# Patient Record
Sex: FEMALE | Race: WHITE
[De-identification: names, ages, dates, MRNs, and addresses within clinical notes are randomized per-mention and may not be internally consistent; named-entity substitution may affect disease eponyms.]

---

## 2018-03-06 ENCOUNTER — APPOINTMENT (OUTPATIENT)
Dept: INTERNAL MEDICINE | Facility: CLINIC | Age: 53
End: 2018-03-06
Payer: COMMERCIAL

## 2018-03-06 VITALS
WEIGHT: 107 LBS | RESPIRATION RATE: 14 BRPM | BODY MASS INDEX: 18.27 KG/M2 | OXYGEN SATURATION: 98 % | HEART RATE: 76 BPM | TEMPERATURE: 97.8 F | HEIGHT: 64 IN | SYSTOLIC BLOOD PRESSURE: 120 MMHG | DIASTOLIC BLOOD PRESSURE: 80 MMHG

## 2018-03-06 DIAGNOSIS — Z82.49 FAMILY HISTORY OF ISCHEMIC HEART DISEASE AND OTHER DISEASES OF THE CIRCULATORY SYSTEM: ICD-10-CM

## 2018-03-06 PROCEDURE — 99396 PREV VISIT EST AGE 40-64: CPT | Mod: 25

## 2018-03-06 PROCEDURE — 36415 COLL VENOUS BLD VENIPUNCTURE: CPT

## 2018-03-06 PROCEDURE — 93000 ELECTROCARDIOGRAM COMPLETE: CPT

## 2018-03-07 LAB
ALBUMIN SERPL ELPH-MCNC: 4.6 G/DL
ALP BLD-CCNC: 95 U/L
ALT SERPL-CCNC: 23 U/L
ANION GAP SERPL CALC-SCNC: 15 MMOL/L
AST SERPL-CCNC: 26 U/L
BASOPHILS # BLD AUTO: 0.03 K/UL
BASOPHILS NFR BLD AUTO: 0.9 %
BILIRUB SERPL-MCNC: 0.4 MG/DL
BUN SERPL-MCNC: 14 MG/DL
CALCIUM SERPL-MCNC: 9.9 MG/DL
CHLORIDE SERPL-SCNC: 103 MMOL/L
CHOLEST SERPL-MCNC: 186 MG/DL
CHOLEST/HDLC SERPL: 2.3 RATIO
CO2 SERPL-SCNC: 25 MMOL/L
CREAT SERPL-MCNC: 0.77 MG/DL
EOSINOPHIL # BLD AUTO: 0.07 K/UL
EOSINOPHIL NFR BLD AUTO: 2.2 %
GLUCOSE SERPL-MCNC: 98 MG/DL
HBA1C MFR BLD HPLC: 5.6 %
HCT VFR BLD CALC: 44.9 %
HDLC SERPL-MCNC: 81 MG/DL
HGB BLD-MCNC: 14 G/DL
IMM GRANULOCYTES NFR BLD AUTO: 0 %
LDLC SERPL CALC-MCNC: 95 MG/DL
LYMPHOCYTES # BLD AUTO: 1.14 K/UL
LYMPHOCYTES NFR BLD AUTO: 35.6 %
MAN DIFF?: NORMAL
MCHC RBC-ENTMCNC: 31.2 GM/DL
MCHC RBC-ENTMCNC: 31.7 PG
MCV RBC AUTO: 101.8 FL
MONOCYTES # BLD AUTO: 0.2 K/UL
MONOCYTES NFR BLD AUTO: 6.3 %
NEUTROPHILS # BLD AUTO: 1.76 K/UL
NEUTROPHILS NFR BLD AUTO: 55 %
PLATELET # BLD AUTO: 209 K/UL
POTASSIUM SERPL-SCNC: 4.7 MMOL/L
PROT SERPL-MCNC: 7.2 G/DL
RBC # BLD: 4.41 M/UL
RBC # FLD: 14.2 %
SODIUM SERPL-SCNC: 143 MMOL/L
TRIGL SERPL-MCNC: 51 MG/DL
TSH SERPL-ACNC: 1.85 UIU/ML
WBC # FLD AUTO: 3.2 K/UL

## 2018-05-30 ENCOUNTER — APPOINTMENT (OUTPATIENT)
Dept: INTERNAL MEDICINE | Facility: CLINIC | Age: 53
End: 2018-05-30
Payer: COMMERCIAL

## 2018-05-30 ENCOUNTER — LABORATORY RESULT (OUTPATIENT)
Age: 53
End: 2018-05-30

## 2018-05-30 VITALS
SYSTOLIC BLOOD PRESSURE: 108 MMHG | TEMPERATURE: 97.3 F | RESPIRATION RATE: 14 BRPM | DIASTOLIC BLOOD PRESSURE: 80 MMHG | HEIGHT: 64 IN | WEIGHT: 111 LBS | HEART RATE: 59 BPM | BODY MASS INDEX: 18.95 KG/M2 | OXYGEN SATURATION: 7 %

## 2018-05-30 DIAGNOSIS — R94.31 ABNORMAL ELECTROCARDIOGRAM [ECG] [EKG]: ICD-10-CM

## 2018-05-30 PROCEDURE — 99213 OFFICE O/P EST LOW 20 MIN: CPT

## 2018-05-30 NOTE — PHYSICAL EXAM
[No Acute Distress] : no acute distress [Well Nourished] : well nourished [Well Developed] : well developed [Soft] : abdomen soft [Normal Bowel Sounds] : normal bowel sounds [de-identified] : mild td over bladder

## 2018-05-30 NOTE — ASSESSMENT
[FreeTextEntry1] : UTI- tx with bactrim and send for ua and urine cx\par  advise f/up with dr Gant re: abnormal EKG

## 2018-05-30 NOTE — HISTORY OF PRESENT ILLNESS
[FreeTextEntry1] : uti [de-identified] : \par Here with uti symptoms - frequency, dysuria.  \par \par Questions regarding her abnormal EKG- \par Last cardiology visit was 3 years ago with Dr Gant.  Has a hx of stress test 3 years ago.

## 2018-05-31 LAB
APPEARANCE: ABNORMAL
BILIRUBIN URINE: NEGATIVE
BLOOD URINE: ABNORMAL
COLOR: YELLOW
GLUCOSE QUALITATIVE U: NEGATIVE MG/DL
KETONES URINE: NEGATIVE
LEUKOCYTE ESTERASE URINE: ABNORMAL
NITRITE URINE: POSITIVE
PH URINE: 6
PROTEIN URINE: NEGATIVE MG/DL
SPECIFIC GRAVITY URINE: 1.01
UROBILINOGEN URINE: NEGATIVE MG/DL

## 2018-12-10 ENCOUNTER — APPOINTMENT (OUTPATIENT)
Dept: OTOLARYNGOLOGY | Facility: CLINIC | Age: 53
End: 2018-12-10
Payer: COMMERCIAL

## 2018-12-10 VITALS
DIASTOLIC BLOOD PRESSURE: 75 MMHG | OXYGEN SATURATION: 97 % | TEMPERATURE: 98.3 F | HEART RATE: 43 BPM | RESPIRATION RATE: 15 BRPM | SYSTOLIC BLOOD PRESSURE: 138 MMHG

## 2018-12-10 VITALS — WEIGHT: 112 LBS | BODY MASS INDEX: 19.22 KG/M2

## 2018-12-10 DIAGNOSIS — Z82.49 FAMILY HISTORY OF ISCHEMIC HEART DISEASE AND OTHER DISEASES OF THE CIRCULATORY SYSTEM: ICD-10-CM

## 2018-12-10 DIAGNOSIS — Z80.9 FAMILY HISTORY OF MALIGNANT NEOPLASM, UNSPECIFIED: ICD-10-CM

## 2018-12-10 DIAGNOSIS — Z82.2 FAMILY HISTORY OF DEAFNESS AND HEARING LOSS: ICD-10-CM

## 2018-12-10 PROCEDURE — 99243 OFF/OP CNSLTJ NEW/EST LOW 30: CPT | Mod: 25

## 2018-12-10 PROCEDURE — 31231 NASAL ENDOSCOPY DX: CPT

## 2018-12-10 PROCEDURE — 92557 COMPREHENSIVE HEARING TEST: CPT

## 2018-12-10 PROCEDURE — 92550 TYMPANOMETRY & REFLEX THRESH: CPT

## 2018-12-17 ENCOUNTER — APPOINTMENT (OUTPATIENT)
Dept: OTOLARYNGOLOGY | Facility: CLINIC | Age: 53
End: 2018-12-17
Payer: COMMERCIAL

## 2018-12-17 VITALS
SYSTOLIC BLOOD PRESSURE: 141 MMHG | DIASTOLIC BLOOD PRESSURE: 74 MMHG | TEMPERATURE: 97.9 F | RESPIRATION RATE: 15 BRPM | HEART RATE: 50 BPM | OXYGEN SATURATION: 96 %

## 2018-12-17 PROCEDURE — 99213 OFFICE O/P EST LOW 20 MIN: CPT

## 2018-12-27 ENCOUNTER — APPOINTMENT (OUTPATIENT)
Dept: OTOLARYNGOLOGY | Facility: CLINIC | Age: 53
End: 2018-12-27
Payer: COMMERCIAL

## 2018-12-27 VITALS
SYSTOLIC BLOOD PRESSURE: 143 MMHG | OXYGEN SATURATION: 100 % | RESPIRATION RATE: 16 BRPM | DIASTOLIC BLOOD PRESSURE: 73 MMHG | HEART RATE: 48 BPM | TEMPERATURE: 97.6 F

## 2018-12-27 PROCEDURE — 69420 INCISION OF EARDRUM: CPT | Mod: RT

## 2018-12-27 PROCEDURE — 92567 TYMPANOMETRY: CPT

## 2018-12-27 PROCEDURE — 99213 OFFICE O/P EST LOW 20 MIN: CPT | Mod: 25

## 2019-01-03 ENCOUNTER — APPOINTMENT (OUTPATIENT)
Dept: OTOLARYNGOLOGY | Facility: CLINIC | Age: 54
End: 2019-01-03
Payer: COMMERCIAL

## 2019-01-03 VITALS
HEART RATE: 54 BPM | HEIGHT: 64 IN | OXYGEN SATURATION: 99 % | RESPIRATION RATE: 16 BRPM | BODY MASS INDEX: 19.12 KG/M2 | TEMPERATURE: 98 F | WEIGHT: 112 LBS | DIASTOLIC BLOOD PRESSURE: 87 MMHG | SYSTOLIC BLOOD PRESSURE: 135 MMHG

## 2019-01-03 PROCEDURE — 99024 POSTOP FOLLOW-UP VISIT: CPT

## 2019-01-17 ENCOUNTER — APPOINTMENT (OUTPATIENT)
Dept: OTOLARYNGOLOGY | Facility: CLINIC | Age: 54
End: 2019-01-17

## 2019-05-03 ENCOUNTER — TRANSCRIPTION ENCOUNTER (OUTPATIENT)
Age: 54
End: 2019-05-03

## 2019-08-02 ENCOUNTER — APPOINTMENT (OUTPATIENT)
Dept: INTERNAL MEDICINE | Facility: CLINIC | Age: 54
End: 2019-08-02
Payer: COMMERCIAL

## 2019-08-02 VITALS
WEIGHT: 111 LBS | OXYGEN SATURATION: 97 % | HEART RATE: 57 BPM | SYSTOLIC BLOOD PRESSURE: 123 MMHG | RESPIRATION RATE: 16 BRPM | DIASTOLIC BLOOD PRESSURE: 78 MMHG | HEIGHT: 64 IN | BODY MASS INDEX: 18.95 KG/M2

## 2019-08-02 DIAGNOSIS — M79.644 PAIN IN RIGHT FINGER(S): ICD-10-CM

## 2019-08-02 DIAGNOSIS — Z87.440 PERSONAL HISTORY OF URINARY (TRACT) INFECTIONS: ICD-10-CM

## 2019-08-02 DIAGNOSIS — H91.91 UNSPECIFIED HEARING LOSS, RIGHT EAR: ICD-10-CM

## 2019-08-02 DIAGNOSIS — Z78.9 OTHER SPECIFIED HEALTH STATUS: ICD-10-CM

## 2019-08-02 DIAGNOSIS — H92.01 OTALGIA, RIGHT EAR: ICD-10-CM

## 2019-08-02 DIAGNOSIS — Z72.89 OTHER PROBLEMS RELATED TO LIFESTYLE: ICD-10-CM

## 2019-08-02 PROCEDURE — 36415 COLL VENOUS BLD VENIPUNCTURE: CPT

## 2019-08-02 PROCEDURE — 99396 PREV VISIT EST AGE 40-64: CPT

## 2019-08-02 PROCEDURE — 93000 ELECTROCARDIOGRAM COMPLETE: CPT

## 2019-08-02 RX ORDER — SULFAMETHOXAZOLE AND TRIMETHOPRIM 800; 160 MG/1; MG/1
800-160 TABLET ORAL TWICE DAILY
Qty: 6 | Refills: 2 | Status: DISCONTINUED | COMMUNITY
Start: 2018-05-30 | End: 2019-08-02

## 2019-08-02 RX ORDER — OFLOXACIN OTIC 3 MG/ML
0.3 SOLUTION AURICULAR (OTIC) TWICE DAILY
Qty: 1 | Refills: 1 | Status: DISCONTINUED | COMMUNITY
Start: 2018-12-27 | End: 2019-08-02

## 2019-08-02 RX ORDER — OMEPRAZOLE 20 MG/1
20 CAPSULE, DELAYED RELEASE ORAL DAILY
Qty: 7 | Refills: 0 | Status: DISCONTINUED | COMMUNITY
Start: 2018-12-13 | End: 2019-08-02

## 2019-08-02 RX ORDER — AMOXICILLIN AND CLAVULANATE POTASSIUM 875; 125 MG/1; MG/1
875-125 TABLET, COATED ORAL
Qty: 14 | Refills: 0 | Status: DISCONTINUED | COMMUNITY
Start: 2018-12-10 | End: 2019-08-02

## 2019-08-02 RX ORDER — METHYLPREDNISOLONE 4 MG/1
4 TABLET ORAL
Qty: 1 | Refills: 0 | Status: DISCONTINUED | COMMUNITY
Start: 2018-12-13 | End: 2019-08-02

## 2019-08-02 RX ORDER — AMOXICILLIN 875 MG/1
875 TABLET, FILM COATED ORAL
Qty: 14 | Refills: 0 | Status: DISCONTINUED | COMMUNITY
Start: 2018-11-24 | End: 2019-08-02

## 2019-08-02 NOTE — PHYSICAL EXAM
[No Acute Distress] : no acute distress [Well Nourished] : well nourished [Well Developed] : well developed [Well-Appearing] : well-appearing [Normal Voice/Communication] : normal voice/communication [Normal Sclera/Conjunctiva] : normal sclera/conjunctiva [PERRL] : pupils equal round and reactive to light [EOMI] : extraocular movements intact [Normal Outer Ear/Nose] : the outer ears and nose were normal in appearance [Normal Oropharynx] : the oropharynx was normal [No JVD] : no jugular venous distention [No Lymphadenopathy] : no lymphadenopathy [Supple] : supple [Thyroid Normal, No Nodules] : the thyroid was normal and there were no nodules present [No Respiratory Distress] : no respiratory distress  [No Accessory Muscle Use] : no accessory muscle use [Clear to Auscultation] : lungs were clear to auscultation bilaterally [Regular Rhythm] : with a regular rhythm [Normal Rate] : normal rate  [Normal S1, S2] : normal S1 and S2 [No Murmur] : no murmur heard [Pedal Pulses Present] : the pedal pulses are present [No Varicosities] : no varicosities [No Edema] : there was no peripheral edema [No Extremity Clubbing/Cyanosis] : no extremity clubbing/cyanosis [Soft] : abdomen soft [Non Tender] : non-tender [No Masses] : no abdominal mass palpated [Non-distended] : non-distended [Normal Bowel Sounds] : normal bowel sounds [Normal Posterior Cervical Nodes] : no posterior cervical lymphadenopathy [Normal Anterior Cervical Nodes] : no anterior cervical lymphadenopathy [No Joint Swelling] : no joint swelling [Grossly Normal Strength/Tone] : grossly normal strength/tone [No Rash] : no rash [Coordination Grossly Intact] : coordination grossly intact [No Focal Deficits] : no focal deficits [Normal Gait] : normal gait [Normal Affect] : the affect was normal [Deep Tendon Reflexes (DTR)] : deep tendon reflexes were 2+ and symmetric [Normal Insight/Judgement] : insight and judgment were intact

## 2019-08-02 NOTE — PAST MEDICAL HISTORY
[Postmenopausal] : postmenopausal [Menopause Age____] : age at menopause was [unfilled] [Total Preg ___] : G[unfilled] [AB Spont ___] : miscarriages: [unfilled]

## 2019-08-04 PROBLEM — Z78.9 CAFFEINE USE: Status: ACTIVE | Noted: 2018-12-10

## 2019-08-04 LAB
ALBUMIN SERPL ELPH-MCNC: 4.6 G/DL
ALP BLD-CCNC: 88 U/L
ALT SERPL-CCNC: 19 U/L
ANION GAP SERPL CALC-SCNC: 11 MMOL/L
APPEARANCE: CLEAR
AST SERPL-CCNC: 22 U/L
BASOPHILS # BLD AUTO: 0.04 K/UL
BASOPHILS NFR BLD AUTO: 1.2 %
BILIRUB SERPL-MCNC: 0.5 MG/DL
BILIRUBIN URINE: NEGATIVE
BLOOD URINE: NEGATIVE
BUN SERPL-MCNC: 14 MG/DL
CALCIUM SERPL-MCNC: 9.6 MG/DL
CHLORIDE SERPL-SCNC: 106 MMOL/L
CHOLEST SERPL-MCNC: 203 MG/DL
CHOLEST/HDLC SERPL: 2.5 RATIO
CO2 SERPL-SCNC: 26 MMOL/L
COLOR: NORMAL
CREAT SERPL-MCNC: 0.66 MG/DL
EOSINOPHIL # BLD AUTO: 0.04 K/UL
EOSINOPHIL NFR BLD AUTO: 1.2 %
ESTIMATED AVERAGE GLUCOSE: 114 MG/DL
GLUCOSE QUALITATIVE U: NEGATIVE
GLUCOSE SERPL-MCNC: 94 MG/DL
HBA1C MFR BLD HPLC: 5.6 %
HCT VFR BLD CALC: 42.8 %
HDLC SERPL-MCNC: 81 MG/DL
HGB BLD-MCNC: 13.6 G/DL
IMM GRANULOCYTES NFR BLD AUTO: 0.3 %
KETONES URINE: NEGATIVE
LDLC SERPL CALC-MCNC: 113 MG/DL
LEUKOCYTE ESTERASE URINE: NEGATIVE
LYMPHOCYTES # BLD AUTO: 1.21 K/UL
LYMPHOCYTES NFR BLD AUTO: 35.1 %
MAN DIFF?: NORMAL
MCHC RBC-ENTMCNC: 31.5 PG
MCHC RBC-ENTMCNC: 31.8 GM/DL
MCV RBC AUTO: 99.1 FL
MONOCYTES # BLD AUTO: 0.3 K/UL
MONOCYTES NFR BLD AUTO: 8.7 %
NEUTROPHILS # BLD AUTO: 1.85 K/UL
NEUTROPHILS NFR BLD AUTO: 53.5 %
NITRITE URINE: NEGATIVE
PH URINE: 5.5
PLATELET # BLD AUTO: 196 K/UL
POTASSIUM SERPL-SCNC: 4.4 MMOL/L
PROT SERPL-MCNC: 6.8 G/DL
PROTEIN URINE: NEGATIVE
RBC # BLD: 4.32 M/UL
RBC # FLD: 13.5 %
SODIUM SERPL-SCNC: 143 MMOL/L
SPECIFIC GRAVITY URINE: 1.01
TRIGL SERPL-MCNC: 47 MG/DL
TSH SERPL-ACNC: 1.97 UIU/ML
UROBILINOGEN URINE: NORMAL
WBC # FLD AUTO: 3.45 K/UL

## 2019-08-04 NOTE — PLAN
[FreeTextEntry1] : # Health Maintenance - Pt feeling well with no issues. [place], mammo, Pap smear all up to date. Following Gyn, and Cards for fhx aortic aneurysm.\par - Will check CBC, CMP, A1c, Lipid profile, TSH, urinalysis today.\par - C/w vitamin D3 and yuvafem.\par - RTC annually or sooner if needed.

## 2019-08-04 NOTE — HISTORY OF PRESENT ILLNESS
[FreeTextEntry1] : Comprehensive Physical Exam [de-identified] : 52 yo female here for comprehensive physical exam. Feeling well and has no complaints.\par \par Colonoscopy in 2015. Mammogram this past December. Pap smear 2018.\par \par Had ear infection end of Nov 2018, went to ENT. Resolved with course of abx. \par Cards evaluation this April for fhx of Aortic aneurysms. ECHO revealed known mitral valve prolapse, otherwise results WNL.

## 2019-08-04 NOTE — REVIEW OF SYSTEMS
[Fever] : no fever [Chills] : no chills [Recent Change In Weight] : ~T no recent weight change [Vision Problems] : no vision problems [Earache] : no earache [Hearing Loss] : no hearing loss [Sore Throat] : no sore throat [Chest Pain] : no chest pain [Palpitations] : no palpitations [Shortness Of Breath] : no shortness of breath [Cough] : no cough [Abdominal Pain] : no abdominal pain [Constipation] : no constipation [Diarrhea] : diarrhea [Dysuria] : no dysuria [Frequency] : no frequency [Vaginal Discharge] : no vaginal discharge [Joint Pain] : no joint pain [Back Pain] : no back pain [Mole Changes] : no mole changes [Skin Rash] : no skin rash [Headache] : no headache [Dizziness] : no dizziness [Anxiety] : no anxiety [Depression] : no depression [Easy Bleeding] : no easy bleeding [Easy Bruising] : no easy bruising

## 2021-05-03 ENCOUNTER — APPOINTMENT (OUTPATIENT)
Dept: INTERNAL MEDICINE | Facility: CLINIC | Age: 56
End: 2021-05-03
Payer: MEDICAID

## 2021-05-03 VITALS
TEMPERATURE: 98.2 F | BODY MASS INDEX: 18.95 KG/M2 | HEART RATE: 67 BPM | DIASTOLIC BLOOD PRESSURE: 82 MMHG | SYSTOLIC BLOOD PRESSURE: 120 MMHG | WEIGHT: 111 LBS | OXYGEN SATURATION: 97 % | RESPIRATION RATE: 16 BRPM | HEIGHT: 64 IN

## 2021-05-03 DIAGNOSIS — R00.2 PALPITATIONS: ICD-10-CM

## 2021-05-03 DIAGNOSIS — Z82.62 FAMILY HISTORY OF OSTEOPOROSIS: ICD-10-CM

## 2021-05-03 PROCEDURE — 99072 ADDL SUPL MATRL&STAF TM PHE: CPT

## 2021-05-03 PROCEDURE — 36415 COLL VENOUS BLD VENIPUNCTURE: CPT

## 2021-05-03 PROCEDURE — 99396 PREV VISIT EST AGE 40-64: CPT

## 2021-05-03 NOTE — HISTORY OF PRESENT ILLNESS
[de-identified] : 56yo female with hx of mvp here for comprehensive physical exam. \par colonoscopy in 2015 - will be returning this year\par recent holter with pvcs and 4beat run of nsvt.\par Needs MRI heart for her mvp but gets very anxious\par \par right knee pain - has been a issue intermittently for years  boyfriend is a pt- she does pronate- seeing podiatry \par gets occasional sciatica.  walks regularly.\par \par s/p covid vaccine x2.  \par \par Mammo this year 2021

## 2021-05-03 NOTE — PHYSICAL EXAM
[No Edema] : there was no peripheral edema [Normal] : affect was normal and insight and judgment were intact [de-identified] : point td right knee medial aspect

## 2021-05-03 NOTE — PLAN
[FreeTextEntry1] : wellness complete\par labs today\par \par xanax 0.25mg for MRI \par \par right knee pain - ortho referral\par \par f/up cards Dr Moris cary\par \par for colonoscopy\par \par send for dexa with strong family history

## 2021-05-06 ENCOUNTER — LABORATORY RESULT (OUTPATIENT)
Age: 56
End: 2021-05-06

## 2021-05-07 LAB
25(OH)D3 SERPL-MCNC: 21.1 NG/ML
ALBUMIN SERPL ELPH-MCNC: 4.5 G/DL
ALP BLD-CCNC: 94 U/L
ALT SERPL-CCNC: 19 U/L
ANION GAP SERPL CALC-SCNC: 11 MMOL/L
AST SERPL-CCNC: 19 U/L
BASOPHILS # BLD AUTO: 0.04 K/UL
BASOPHILS NFR BLD AUTO: 1.1 %
BILIRUB SERPL-MCNC: 0.3 MG/DL
BUN SERPL-MCNC: 17 MG/DL
CALCIUM SERPL-MCNC: 9.6 MG/DL
CHLORIDE SERPL-SCNC: 106 MMOL/L
CHOLEST SERPL-MCNC: 192 MG/DL
CO2 SERPL-SCNC: 25 MMOL/L
CREAT SERPL-MCNC: 0.7 MG/DL
EOSINOPHIL # BLD AUTO: 0.09 K/UL
EOSINOPHIL NFR BLD AUTO: 2.5 %
ESTIMATED AVERAGE GLUCOSE: 117 MG/DL
GLUCOSE SERPL-MCNC: 100 MG/DL
HBA1C MFR BLD HPLC: 5.7 %
HCT VFR BLD CALC: 43.2 %
HDLC SERPL-MCNC: 78 MG/DL
HGB BLD-MCNC: 13.6 G/DL
IMM GRANULOCYTES NFR BLD AUTO: 0 %
LDLC SERPL CALC-MCNC: 102 MG/DL
LYMPHOCYTES # BLD AUTO: 1.52 K/UL
LYMPHOCYTES NFR BLD AUTO: 42.2 %
MAN DIFF?: NORMAL
MCHC RBC-ENTMCNC: 31.5 GM/DL
MCHC RBC-ENTMCNC: 31.6 PG
MCV RBC AUTO: 100.5 FL
MONOCYTES # BLD AUTO: 0.34 K/UL
MONOCYTES NFR BLD AUTO: 9.4 %
NEUTROPHILS # BLD AUTO: 1.61 K/UL
NEUTROPHILS NFR BLD AUTO: 44.8 %
NONHDLC SERPL-MCNC: 113 MG/DL
PLATELET # BLD AUTO: 196 K/UL
POTASSIUM SERPL-SCNC: 4.3 MMOL/L
PROT SERPL-MCNC: 6.9 G/DL
RBC # BLD: 4.3 M/UL
RBC # FLD: 13.2 %
SODIUM SERPL-SCNC: 142 MMOL/L
TRIGL SERPL-MCNC: 58 MG/DL
TSH SERPL-ACNC: 2.14 UIU/ML
WBC # FLD AUTO: 3.6 K/UL

## 2021-06-18 ENCOUNTER — TRANSCRIPTION ENCOUNTER (OUTPATIENT)
Age: 56
End: 2021-06-18

## 2021-06-24 ENCOUNTER — RESULT REVIEW (OUTPATIENT)
Age: 56
End: 2021-06-24

## 2021-06-24 ENCOUNTER — OUTPATIENT (OUTPATIENT)
Dept: OUTPATIENT SERVICES | Facility: HOSPITAL | Age: 56
LOS: 1 days | End: 2021-06-24

## 2021-06-24 ENCOUNTER — APPOINTMENT (OUTPATIENT)
Dept: RADIOLOGY | Facility: CLINIC | Age: 56
End: 2021-06-24
Payer: MEDICAID

## 2021-06-24 PROCEDURE — 77080 DXA BONE DENSITY AXIAL: CPT | Mod: 26

## 2021-07-01 ENCOUNTER — TRANSCRIPTION ENCOUNTER (OUTPATIENT)
Age: 56
End: 2021-07-01

## 2021-07-02 ENCOUNTER — NON-APPOINTMENT (OUTPATIENT)
Age: 56
End: 2021-07-02

## 2021-07-08 ENCOUNTER — NON-APPOINTMENT (OUTPATIENT)
Age: 56
End: 2021-07-08

## 2021-07-08 ENCOUNTER — RX RENEWAL (OUTPATIENT)
Age: 56
End: 2021-07-08

## 2021-07-08 RX ORDER — ALPRAZOLAM 0.25 MG/1
0.25 TABLET ORAL
Qty: 6 | Refills: 0 | Status: ACTIVE | COMMUNITY
Start: 2021-05-03 | End: 1900-01-01

## 2021-07-15 ENCOUNTER — NON-APPOINTMENT (OUTPATIENT)
Age: 56
End: 2021-07-15

## 2021-07-15 ENCOUNTER — APPOINTMENT (OUTPATIENT)
Dept: ENDOCRINOLOGY | Facility: CLINIC | Age: 56
End: 2021-07-15
Payer: MEDICAID

## 2021-07-15 VITALS
TEMPERATURE: 97.6 F | HEART RATE: 58 BPM | DIASTOLIC BLOOD PRESSURE: 87 MMHG | HEIGHT: 64 IN | OXYGEN SATURATION: 97 % | BODY MASS INDEX: 19.12 KG/M2 | WEIGHT: 112 LBS | SYSTOLIC BLOOD PRESSURE: 151 MMHG

## 2021-07-15 PROCEDURE — 99204 OFFICE O/P NEW MOD 45 MIN: CPT

## 2021-07-15 NOTE — REVIEW OF SYSTEMS
[Fatigue] : no fatigue [Decreased Appetite] : appetite not decreased [Chest Pain] : no chest pain [Palpitations] : no palpitations [Shortness Of Breath] : no shortness of breath [Nausea] : no nausea [Vomiting] : no vomiting [Cold Intolerance] : cold intolerance

## 2021-07-15 NOTE — HISTORY OF PRESENT ILLNESS
[Vitamin D (oral)] : Vitamin D orally [Family History of Breast Cancer] : family history of breast cancer [Family History of Osteoporosis] : family history of osteoporosis [FreeTextEntry1] : Patient is a 56 yo woman establishing care for osteoporosis.\par \par Diagnosed with osteoporosis based on screening DXA this year.  She asked her gynecologist two years ago if osteoporosis testing should be done earlier based on family hx.  Patient was busy and did not get the screening at the time.  The pandemic happened and she just received her DXA.  No exposures to osteoporosis medicines. \par States she has fallen "oddly" due to being clumsy.  Fell three years ago and hit her face in [place].  Patient had a concussion.\par No fractures, broke nose after falling down subway steps about 15-20 years ago\par Menopause age 50.  Has some cold intolerance but no reported hot flashes.  Never been on HRT. Took OCPs in the past for "hyperplasia" duration of 1 year.  History of D and C's.  Met with an endocrinologist around her age 30's for dark hair on face who recommended aldactone. She did not take the aldactone but pursued electrolysis.  \par Diet: drinks milk, eats cheese\par Walks a lot-about 1 hour daily, used to go to the gym but stopped going since the pandemic.  Swims. \par When in high school, patient became vegetarian and went on a "Newberry" diet. Calories were low at that time and was told by a doctor that she was "too thin."  Denies any anorexia, denies restrictive eating. [Taking Steroids] : no history of taking steroids [Hyperparathyroidism] : no history of hyperparathyroidism [Diabetes] : no history of diabetes [Kidney Stones] : no history of kidney stones [Excessive Alcohol Intake] : no excessive alcohol intake [Family History of Hip Fracture] : no family history of hip fracture [History of Radiation Therapy] : no history of radiation therapy [History of Blood Clots] : no history of blood clots [High Fall Risk] : no fall risk [de-identified] : ? Mother with possible breast cancer,

## 2021-07-15 NOTE — CONSULT LETTER
[Dear  ___] : Dear  [unfilled], [Consult Letter:] : I had the pleasure of evaluating your patient, [unfilled]. [Please see my note below.] : Please see my note below. [Consult Closing:] : Thank you very much for allowing me to participate in the care of this patient.  If you have any questions, please do not hesitate to contact me. [Sincerely,] : Sincerely, [FreeTextEntry3] : Miriam Chavez MD

## 2021-07-15 NOTE — PHYSICAL EXAM
[Alert] : alert [Well Nourished] : well nourished [No Acute Distress] : no acute distress [EOMI] : extra ocular movement intact [Normal Hearing] : hearing was normal [Thyroid Not Enlarged] : the thyroid was not enlarged [No Respiratory Distress] : no respiratory distress [No Accessory Muscle Use] : no accessory muscle use [Clear to Auscultation] : lungs were clear to auscultation bilaterally [Normal S1, S2] : normal S1 and S2 [Normal Rate] : heart rate was normal [Normal Bowel Sounds] : normal bowel sounds [Not Tender] : non-tender [Soft] : abdomen soft [No Stigmata of Cushings Syndrome] : no stigmata of Cushings Syndrome [Normal Gait] : normal gait [Normal Strength/Tone] : muscle strength and tone were normal [No Motor Deficits] : the motor exam was normal [Normal Affect] : the affect was normal [Normal Insight/Judgement] : insight and judgment were intact [Normal Mood] : the mood was normal

## 2021-07-15 NOTE — ASSESSMENT
[FreeTextEntry1] : Patient is a 54 yo woman establishing endocrine care for osteoporosis.\par \par 1. Osteoporosis\par -patient is post-menopausal with osteoporosis based on DXA\par -denies clinical fractures\par -lowest T score is in the femoral neck measuring -2.8\par -check PTH, vitamin D and CMP levels\par -no plans for major dental work\par -fall risk precautions discussed\par -discussed treatment options including anti-resorptive and anabolic therapy. \par -recommend anti-resorptive medicine at this time.  Side effects including acid reflux/heartburn, hypocalcemia and in rare cases ONJ/AFF were discussed\par -anticipate checking bone density in one year to monitor response to therapy\par -goal vitamin D >30\par -encourage 2-3 servings of calcium rich foods including dairy, bony fish, yogurt/cheese as long as cholesterol levels are within expected normative ranges\par -walking encouraged\par -start Fosamax once a week, Rx sent to pharmacy\par \par Follow up in six months\par

## 2021-07-15 NOTE — REASON FOR VISIT
[Initial Evaluation] : an initial evaluation [Osteoporosis] : osteoporosis [FreeTextEntry2] : Dr. Marco Soares

## 2021-07-16 LAB
25(OH)D3 SERPL-MCNC: 28.8 NG/ML
ALBUMIN SERPL ELPH-MCNC: 4.6 G/DL
ALP BLD-CCNC: 99 U/L
ALT SERPL-CCNC: 21 U/L
ANION GAP SERPL CALC-SCNC: 11 MMOL/L
AST SERPL-CCNC: 22 U/L
BILIRUB SERPL-MCNC: 0.5 MG/DL
BUN SERPL-MCNC: 18 MG/DL
CALCIUM SERPL-MCNC: 9.6 MG/DL
CALCIUM SERPL-MCNC: 9.6 MG/DL
CHLORIDE SERPL-SCNC: 105 MMOL/L
CO2 SERPL-SCNC: 26 MMOL/L
CREAT SERPL-MCNC: 0.63 MG/DL
ESTIMATED AVERAGE GLUCOSE: 114 MG/DL
GLUCOSE SERPL-MCNC: 135 MG/DL
HBA1C MFR BLD HPLC: 5.6 %
PARATHYROID HORMONE INTACT: 57 PG/ML
POTASSIUM SERPL-SCNC: 4.2 MMOL/L
PROT SERPL-MCNC: 6.8 G/DL
SODIUM SERPL-SCNC: 142 MMOL/L
T4 FREE SERPL-MCNC: 1.3 NG/DL
TSH SERPL-ACNC: 1.64 UIU/ML

## 2021-08-05 ENCOUNTER — TRANSCRIPTION ENCOUNTER (OUTPATIENT)
Age: 56
End: 2021-08-05

## 2021-08-09 ENCOUNTER — APPOINTMENT (OUTPATIENT)
Dept: DERMATOLOGY | Facility: CLINIC | Age: 56
End: 2021-08-09
Payer: COMMERCIAL

## 2021-08-09 ENCOUNTER — LABORATORY RESULT (OUTPATIENT)
Age: 56
End: 2021-08-09

## 2021-08-09 ENCOUNTER — NON-APPOINTMENT (OUTPATIENT)
Age: 56
End: 2021-08-09

## 2021-08-09 PROCEDURE — 99203 OFFICE O/P NEW LOW 30 MIN: CPT | Mod: 25

## 2021-08-09 PROCEDURE — 17110 DESTRUCTION B9 LES UP TO 14: CPT | Mod: 59

## 2021-08-09 PROCEDURE — 11102 TANGNTL BX SKIN SINGLE LES: CPT | Mod: 59

## 2021-08-09 PROCEDURE — 17000 DESTRUCT PREMALG LESION: CPT | Mod: 59

## 2021-08-09 NOTE — PHYSICAL EXAM
[Alert] : alert [Oriented x 3] : ~L oriented x 3 [Well Nourished] : well nourished [Conjunctiva Non-injected] : conjunctiva non-injected [No Visual Lymphadenopathy] : no visual  lymphadenopathy [No Clubbing] : no clubbing [No Edema] : no edema [No Bromhidrosis] : no bromhidrosis [No Chromhidrosis] : no chromhidrosis [FreeTextEntry3] : L cheek even brown papule with adjacent pinkish red papule \par L temple gritty pink papule\par L lateral ankle stuck on tan plaque\par R abdomen stuck on brown and tan papule

## 2021-08-09 NOTE — HISTORY OF PRESENT ILLNESS
[FreeTextEntry1] : spot on abdomen, leg, and cheek  [de-identified] : new spot below longstanding mole L cheek, no bleeding\par concerned because mother had skin cancers that looked like pimples (bcc)\par no personal hx skin cancer\par also rough spots on abdomen and L ankle\par recently had dark spot peel off toe

## 2021-08-19 ENCOUNTER — NON-APPOINTMENT (OUTPATIENT)
Age: 56
End: 2021-08-19

## 2021-08-25 ENCOUNTER — NON-APPOINTMENT (OUTPATIENT)
Age: 56
End: 2021-08-25

## 2022-01-06 ENCOUNTER — APPOINTMENT (OUTPATIENT)
Dept: ENDOCRINOLOGY | Facility: CLINIC | Age: 57
End: 2022-01-06
Payer: COMMERCIAL

## 2022-01-06 VITALS
WEIGHT: 114 LBS | BODY MASS INDEX: 19.46 KG/M2 | TEMPERATURE: 97.3 F | SYSTOLIC BLOOD PRESSURE: 144 MMHG | DIASTOLIC BLOOD PRESSURE: 87 MMHG | OXYGEN SATURATION: 99 % | HEART RATE: 67 BPM | HEIGHT: 64 IN

## 2022-01-06 PROCEDURE — 99214 OFFICE O/P EST MOD 30 MIN: CPT

## 2022-01-06 NOTE — HISTORY OF PRESENT ILLNESS
[FreeTextEntry1] : Patient is a 57 yo woman following up for osteoporosis.\par \par Diagnosed with osteoporosis based on screening DXA in 2021. She asked her gynecologist two years ago if osteoporosis testing should be done earlier based on family hx. Patient was busy and did not get the screening at the time. The pandemic happened and she just received her DXA. \par No exposures to osteoporosis medicines. \par States she has fallen "oddly" due to being clumsy. Fell three years ago and hit her face in [place]. Patient had a concussion.\par No fractures, broke nose after falling down subway steps about 15-20 years ago\par Menopause age 50. Has some cold intolerance but no reported hot flashes. Never been on HRT. Took OCPs in the past for "hyperplasia" duration of 1 year. \par Diet: drinks milk, eats cheese\par Walks a lot-about 1 hour daily, used to go to the gym but stopped going since the pandemic. Swims. \par Based on DA with femoral neck T score -2.8, Fosamax was started July 2021\par GYN: changed euvafem to another medication called FEM-HRT stating it was better for osteoporosis. Treatment is for vaginal dryness. Was told that "Fosamax makes bones brittle and that this can help with it."  Dr. Gómez Joseph\par States she burps day of Fosamax and day after with resolution. Perhaps it could be do to the water she is drinking with it\par No plans for major dental work\par No interval falls

## 2022-01-06 NOTE — ASSESSMENT
[FreeTextEntry1] : Patient is a 55 yo woman here for osteoporosis\par \par 1. Osteoporosis\par -patient was started on Fosamax July 2021, states she is tolerating it but burps after.  She can follow up with PCP and/or GI for work up if needed\par -no interval falls/fractures\par -no plans for major dental work\par -GYN Dr. Sanchez started HRT as well.  Discussed that HRT is not used solely for purposes of osteoporosis or to "counter act" the Fosamax.  Indications for HRT are directed by GYN as it has it's own risks/benefit proflie\par -continue with Fosamax\par -no ONJ/AFF symptoms\par -repeat DXA July 2022\par -check calcium and vitamin D levels\par -fall precautions\par -patient had several questions about dairy being bad for her bones. Discussed that a good absorbable source of calcium is dairy and that it does not cause "bad bones."  She also asked whether Fosamax has to be stopped at five years. We discussed that the five year holiday is based on patient's progress and we can address in the future\par \par All questions answered at time of visit.  Follow up in 6 months

## 2022-01-06 NOTE — REVIEW OF SYSTEMS
[Fatigue] : no fatigue [Decreased Appetite] : appetite not decreased [Dysphagia] : no dysphagia [Dysphonia] : no dysphonia [Chest Pain] : no chest pain [Palpitations] : no palpitations [Shortness Of Breath] : no shortness of breath [Nausea] : no nausea [Constipation] : no constipation [Vomiting] : no vomiting [Diarrhea] : no diarrhea [Headaches] : no headaches [Tremors] : no tremors

## 2022-01-07 LAB
25(OH)D3 SERPL-MCNC: 28.9 NG/ML
ANION GAP SERPL CALC-SCNC: 14 MMOL/L
BUN SERPL-MCNC: 10 MG/DL
CALCIUM SERPL-MCNC: 9.9 MG/DL
CHLORIDE SERPL-SCNC: 105 MMOL/L
CO2 SERPL-SCNC: 24 MMOL/L
CREAT SERPL-MCNC: 0.73 MG/DL
GLUCOSE SERPL-MCNC: 96 MG/DL
POTASSIUM SERPL-SCNC: 4.1 MMOL/L
SODIUM SERPL-SCNC: 142 MMOL/L

## 2022-01-11 ENCOUNTER — TRANSCRIPTION ENCOUNTER (OUTPATIENT)
Age: 57
End: 2022-01-11

## 2022-02-17 ENCOUNTER — APPOINTMENT (OUTPATIENT)
Dept: DERMATOLOGY | Facility: CLINIC | Age: 57
End: 2022-02-17
Payer: COMMERCIAL

## 2022-02-17 PROCEDURE — 99214 OFFICE O/P EST MOD 30 MIN: CPT | Mod: 25

## 2022-02-17 PROCEDURE — 17110 DESTRUCTION B9 LES UP TO 14: CPT

## 2022-02-17 RX ORDER — HYDROCORTISONE 25 MG/G
2.5 CREAM TOPICAL
Qty: 1 | Refills: 0 | Status: ACTIVE | COMMUNITY
Start: 2022-02-17 | End: 1900-01-01

## 2022-02-17 NOTE — HISTORY OF PRESENT ILLNESS
[FreeTextEntry1] : fu moles [de-identified] : established patient\par last visit august - angioma on cheek biopsied and AK L temple\par also ISK on abdomen did not come off\par itch without rash on and off inguinal folds\par yeast infection cream helps, switched to free and clear no dryer sheets or other products

## 2022-02-17 NOTE — PHYSICAL EXAM
[Alert] : alert [Oriented x 3] : ~L oriented x 3 [Well Nourished] : well nourished [Conjunctiva Non-injected] : conjunctiva non-injected [No Visual Lymphadenopathy] : no visual  lymphadenopathy [No Clubbing] : no clubbing [No Edema] : no edema [No Bromhidrosis] : no bromhidrosis [No Chromhidrosis] : no chromhidrosis [Full Body Skin Exam Performed] : performed [FreeTextEntry3] : white skin\par stuck on brown papule right abdomen and on back \par no dermatitis inguinal folds\par

## 2022-02-25 ENCOUNTER — TRANSCRIPTION ENCOUNTER (OUTPATIENT)
Age: 57
End: 2022-02-25

## 2022-06-08 ENCOUNTER — RESULT REVIEW (OUTPATIENT)
Age: 57
End: 2022-06-08

## 2022-06-08 ENCOUNTER — OUTPATIENT (OUTPATIENT)
Dept: OUTPATIENT SERVICES | Facility: HOSPITAL | Age: 57
LOS: 1 days | End: 2022-06-08

## 2022-06-08 ENCOUNTER — APPOINTMENT (OUTPATIENT)
Dept: ULTRASOUND IMAGING | Facility: CLINIC | Age: 57
End: 2022-06-08
Payer: COMMERCIAL

## 2022-06-08 PROCEDURE — 76536 US EXAM OF HEAD AND NECK: CPT | Mod: 26

## 2022-06-13 ENCOUNTER — TRANSCRIPTION ENCOUNTER (OUTPATIENT)
Age: 57
End: 2022-06-13

## 2022-07-14 ENCOUNTER — APPOINTMENT (OUTPATIENT)
Dept: ENDOCRINOLOGY | Facility: CLINIC | Age: 57
End: 2022-07-14

## 2022-07-14 VITALS
DIASTOLIC BLOOD PRESSURE: 98 MMHG | HEIGHT: 64 IN | WEIGHT: 113 LBS | OXYGEN SATURATION: 98 % | HEART RATE: 82 BPM | SYSTOLIC BLOOD PRESSURE: 153 MMHG | BODY MASS INDEX: 19.29 KG/M2 | TEMPERATURE: 97.3 F

## 2022-07-14 PROCEDURE — 99214 OFFICE O/P EST MOD 30 MIN: CPT

## 2022-07-14 NOTE — HISTORY OF PRESENT ILLNESS
[FreeTextEntry1] : Patient is a 57 yo woman following up for osteoporosis.\par \par Diagnosed with osteoporosis based on screening DXA in 2021. She asked her gynecologist two years ago if osteoporosis testing should be done earlier based on family hx. Patient was busy and did not get the screening at the time. The pandemic happened and she received her DXA January 2022. \par No exposures to osteoporosis medicines. \par States she has fallen "oddly" due to being clumsy. Fell three years ago and hit her face in [place]. Patient had a concussion.\par No fractures, broke nose after falling down subway steps about 15-20 years ago\par Menopause age 50. Has some cold intolerance but no reported hot flashes. Never been on HRT. Took OCPs in the past for "hyperplasia" duration of 1 year. \par Diet: drinks milk, eats cheese\par Walks a lot-about 1 hour daily, used to go to the gym but stopped going since the pandemic. Swims. \par Based on DA with femoral neck T score -2.8, Fosamax was started July 2021\par No plans for major dental work\par No interval falls \par \par Patient reports she had carotid doppler with incidental finding of thyroid nodules.  Diagnostic US revealed small thyroid nodules \par No family hx of thyroid disease

## 2022-07-14 NOTE — REVIEW OF SYSTEMS
[Fatigue] : no fatigue [Decreased Appetite] : appetite not decreased [Dysphagia] : no dysphagia [Dysphonia] : no dysphonia [Shortness Of Breath] : no shortness of breath [Nausea] : no nausea [Constipation] : no constipation [Vomiting] : no vomiting [Diarrhea] : no diarrhea [Dry Skin] : no dry skin [Cold Intolerance] : cold intolerance [FreeTextEntry9] : left side pain

## 2022-07-14 NOTE — PHYSICAL EXAM
[Alert] : alert [Well Nourished] : well nourished [No Acute Distress] : no acute distress [EOMI] : extra ocular movement intact [Normal Hearing] : hearing was normal [No Respiratory Distress] : no respiratory distress [No Accessory Muscle Use] : no accessory muscle use [Clear to Auscultation] : lungs were clear to auscultation bilaterally [Normal S1, S2] : normal S1 and S2 [Normal Rate] : heart rate was normal [Normal Bowel Sounds] : normal bowel sounds [Not Tender] : non-tender [Soft] : abdomen soft [No Stigmata of Cushings Syndrome] : no stigmata of Cushings Syndrome [Normal Gait] : normal gait [Normal Strength/Tone] : muscle strength and tone were normal [No Motor Deficits] : the motor exam was normal [Normal Affect] : the affect was normal [Normal Insight/Judgement] : insight and judgment were intact [Normal Mood] : the mood was normal

## 2022-07-14 NOTE — ASSESSMENT
[FreeTextEntry1] : Patient is a 55 yo woman here for osteoporosis and thyroid nodules\par \par 1. Osteoporosis\par -patient was started on Fosamax July 2021, states she is tolerating medicine without reported side effects.  Reports intermittent left sided pain.  Recommend discussion with PCP.  Denies mid thigh pain.  Was educated about AFF symptoms and that it presents with thigh pain\par -no interval falls/fractures\par -no plans for major dental work\par -no ONJ/AFF symptoms\par -repeat DXA now; referral given to monitor response to treatment\par -fall precautions\par -patient with several questions about her current HRT. Discussed that HRT has role in menopause symptoms not usually enough to treat osteoporosis as a single agent. Risks of HRT should be individualized and she can discuss it with her prescribing physician\par -check vitamin D level\par \par 2. Thyroid nodules\par -patient had a thyroid US completed June 2022; incidental finding\par -subcentimeter thyroid nodules that do not meet criteria for FNA\par -reports cold intolerance\par -check annual TFTs\par \par All questions answered at time of visit. Follow up in 6 months. \par  [Bisphosphonate Therapy] : Risks and benefits of bisphosphonate therapy were  discussed with the patient including gastroesophageal irritation, osteonecrosis of the jaw, and atypical femur fractures, and acute phase reaction

## 2022-07-15 LAB
25(OH)D3 SERPL-MCNC: 32.9 NG/ML
T4 FREE SERPL-MCNC: 1.4 NG/DL
TSH SERPL-ACNC: 1.94 UIU/ML

## 2022-07-21 ENCOUNTER — OUTPATIENT (OUTPATIENT)
Dept: OUTPATIENT SERVICES | Facility: HOSPITAL | Age: 57
LOS: 1 days | End: 2022-07-21

## 2022-07-21 ENCOUNTER — APPOINTMENT (OUTPATIENT)
Dept: RADIOLOGY | Facility: CLINIC | Age: 57
End: 2022-07-21

## 2022-07-21 ENCOUNTER — RESULT REVIEW (OUTPATIENT)
Age: 57
End: 2022-07-21

## 2022-07-21 PROCEDURE — 77080 DXA BONE DENSITY AXIAL: CPT | Mod: 26

## 2022-08-18 ENCOUNTER — NON-APPOINTMENT (OUTPATIENT)
Age: 57
End: 2022-08-18

## 2022-09-16 ENCOUNTER — APPOINTMENT (OUTPATIENT)
Dept: INTERNAL MEDICINE | Facility: CLINIC | Age: 57
End: 2022-09-16

## 2022-09-19 ENCOUNTER — APPOINTMENT (OUTPATIENT)
Dept: DERMATOLOGY | Facility: CLINIC | Age: 57
End: 2022-09-19

## 2022-09-19 DIAGNOSIS — L82.0 INFLAMED SEBORRHEIC KERATOSIS: ICD-10-CM

## 2022-09-19 DIAGNOSIS — D18.01 HEMANGIOMA OF SKIN AND SUBCUTANEOUS TISSUE: ICD-10-CM

## 2022-09-19 PROCEDURE — 17110 DESTRUCTION B9 LES UP TO 14: CPT

## 2022-09-19 PROCEDURE — 99213 OFFICE O/P EST LOW 20 MIN: CPT | Mod: 25

## 2022-09-19 NOTE — PHYSICAL EXAM
[Alert] : alert [Oriented x 3] : ~L oriented x 3 [Well Nourished] : well nourished [Conjunctiva Non-injected] : conjunctiva non-injected [No Visual Lymphadenopathy] : no visual  lymphadenopathy [No Clubbing] : no clubbing [No Edema] : no edema [No Bromhidrosis] : no bromhidrosis [No Chromhidrosis] : no chromhidrosis [FreeTextEntry3] : stuck on brown papule right abdomen\par L cheek pink scar with telangiectasia

## 2022-09-19 NOTE — HISTORY OF PRESENT ILLNESS
[FreeTextEntry1] : spot on abdomen, cheek [de-identified] : cbe in feb \par LN to isk abdomen, grew back some\par wants to check bx site L cheek

## 2022-09-22 ENCOUNTER — RX RENEWAL (OUTPATIENT)
Age: 57
End: 2022-09-22

## 2022-11-17 ENCOUNTER — APPOINTMENT (OUTPATIENT)
Dept: INTERNAL MEDICINE | Facility: CLINIC | Age: 57
End: 2022-11-17

## 2022-11-17 VITALS
HEART RATE: 74 BPM | TEMPERATURE: 96.8 F | WEIGHT: 110 LBS | BODY MASS INDEX: 18.78 KG/M2 | OXYGEN SATURATION: 97 % | HEIGHT: 64 IN | SYSTOLIC BLOOD PRESSURE: 134 MMHG | RESPIRATION RATE: 16 BRPM | DIASTOLIC BLOOD PRESSURE: 84 MMHG

## 2022-11-17 DIAGNOSIS — L29.9 PRURITUS, UNSPECIFIED: ICD-10-CM

## 2022-11-17 DIAGNOSIS — Z00.00 ENCOUNTER FOR GENERAL ADULT MEDICAL EXAMINATION W/OUT ABNORMAL FINDINGS: ICD-10-CM

## 2022-11-17 LAB
CHOLEST SERPL-MCNC: 199 MG/DL
HDLC SERPL-MCNC: 74 MG/DL
LDLC SERPL CALC-MCNC: 111 MG/DL
NONHDLC SERPL-MCNC: 125 MG/DL
TRIGL SERPL-MCNC: 65 MG/DL

## 2022-11-17 PROCEDURE — 93000 ELECTROCARDIOGRAM COMPLETE: CPT

## 2022-11-17 PROCEDURE — 99396 PREV VISIT EST AGE 40-64: CPT

## 2022-11-17 PROCEDURE — 36415 COLL VENOUS BLD VENIPUNCTURE: CPT

## 2022-11-17 NOTE — PLAN
[FreeTextEntry1] : wellness complete\par labs today\par osteoporosis-  cont fosamax\par \par ? MVP - f/up with cardiology\par  EKG NSR\par \par plan for ophtho\par colonoscopy\par

## 2022-11-17 NOTE — PHYSICAL EXAM
[No Edema] : there was no peripheral edema [Normal] : affect was normal and insight and judgment were intact [de-identified] : point td right knee medial aspect

## 2022-11-17 NOTE — HISTORY OF PRESENT ILLNESS
[de-identified] : 58yo female with hx of mvp, osteoporosis here for comprehensive physical exam. \par \par - recent cardiac workup with cardiology at [facility]- told she does not have MVP\par - recent itch- gyn and derm did not know\par - she stopped the metoprolol itching did imporve but still there.stopped vit D and it improved more. \par colonoscopy in 2022\par getting mammo annually

## 2022-11-18 LAB
ALBUMIN SERPL ELPH-MCNC: 4.7 G/DL
ALP BLD-CCNC: 42 U/L
ALT SERPL-CCNC: 15 U/L
ANION GAP SERPL CALC-SCNC: 12 MMOL/L
AST SERPL-CCNC: 22 U/L
BASOPHILS # BLD AUTO: 0.04 K/UL
BASOPHILS NFR BLD AUTO: 0.7 %
BILIRUB SERPL-MCNC: 0.4 MG/DL
BUN SERPL-MCNC: 12 MG/DL
CALCIUM SERPL-MCNC: 9.4 MG/DL
CHLORIDE SERPL-SCNC: 106 MMOL/L
CO2 SERPL-SCNC: 24 MMOL/L
CREAT SERPL-MCNC: 0.65 MG/DL
EGFR: 101 ML/MIN/1.73M2
EOSINOPHIL # BLD AUTO: 0.03 K/UL
EOSINOPHIL NFR BLD AUTO: 0.5 %
ESTIMATED AVERAGE GLUCOSE: 117 MG/DL
GLUCOSE SERPL-MCNC: 98 MG/DL
HBA1C MFR BLD HPLC: 5.7 %
HCT VFR BLD CALC: 42.4 %
HGB BLD-MCNC: 13.8 G/DL
IMM GRANULOCYTES NFR BLD AUTO: 0.2 %
LYMPHOCYTES # BLD AUTO: 1.44 K/UL
LYMPHOCYTES NFR BLD AUTO: 25.3 %
MAN DIFF?: NORMAL
MCHC RBC-ENTMCNC: 31.7 PG
MCHC RBC-ENTMCNC: 32.5 GM/DL
MCV RBC AUTO: 97.5 FL
MONOCYTES # BLD AUTO: 0.35 K/UL
MONOCYTES NFR BLD AUTO: 6.1 %
NEUTROPHILS # BLD AUTO: 3.83 K/UL
NEUTROPHILS NFR BLD AUTO: 67.2 %
PLATELET # BLD AUTO: 210 K/UL
POTASSIUM SERPL-SCNC: 3.8 MMOL/L
PROT SERPL-MCNC: 7 G/DL
RBC # BLD: 4.35 M/UL
RBC # FLD: 13.1 %
SODIUM SERPL-SCNC: 142 MMOL/L
TSH SERPL-ACNC: 1.52 UIU/ML
WBC # FLD AUTO: 5.7 K/UL

## 2022-11-22 ENCOUNTER — APPOINTMENT (OUTPATIENT)
Dept: OTOLARYNGOLOGY | Facility: CLINIC | Age: 57
End: 2022-11-22

## 2022-11-22 VITALS — BODY MASS INDEX: 18.95 KG/M2 | TEMPERATURE: 97 F | WEIGHT: 111 LBS | HEIGHT: 64 IN

## 2022-11-22 DIAGNOSIS — H93.299 OTHER ABNORMAL AUDITORY PERCEPTIONS, UNSPECIFIED EAR: ICD-10-CM

## 2022-11-22 PROCEDURE — 92557 COMPREHENSIVE HEARING TEST: CPT

## 2022-11-22 PROCEDURE — 99203 OFFICE O/P NEW LOW 30 MIN: CPT

## 2022-11-22 PROCEDURE — 92550 TYMPANOMETRY & REFLEX THRESH: CPT | Mod: 52

## 2022-12-05 PROBLEM — H93.299 ABNORMAL AUDITORY PERCEPTION: Status: ACTIVE | Noted: 2022-11-23

## 2022-12-05 NOTE — ASSESSMENT
[FreeTextEntry1] : the audiogram was ordered by me and interpreted by me today and the results are as follows-he has normal symmetric pure-tone hearing with normal speech discrimination scores and tympanograms.  There are some frequencies that are close to borderline but by definition normal.  I suspect those borderline frequencies are white she at times feels that her hearing perception is not normal.\par No further work-up or treatment required.\par Follow-up in 1 year.

## 2022-12-05 NOTE — HISTORY OF PRESENT ILLNESS
[de-identified] : Initial visit.  She presents today with a chief complaint of "trouble hearing".\par What she is describing is a perceived decrease in sense of hearing.  She does not complain of tinnitus.  She does not have a history of chronic ear problems.

## 2022-12-15 ENCOUNTER — APPOINTMENT (OUTPATIENT)
Dept: HEART AND VASCULAR | Facility: CLINIC | Age: 57
End: 2022-12-15

## 2022-12-15 ENCOUNTER — NON-APPOINTMENT (OUTPATIENT)
Age: 57
End: 2022-12-15

## 2022-12-15 VITALS — SYSTOLIC BLOOD PRESSURE: 150 MMHG | DIASTOLIC BLOOD PRESSURE: 100 MMHG

## 2022-12-15 VITALS
SYSTOLIC BLOOD PRESSURE: 148 MMHG | BODY MASS INDEX: 19.12 KG/M2 | HEART RATE: 84 BPM | WEIGHT: 112 LBS | DIASTOLIC BLOOD PRESSURE: 100 MMHG | HEIGHT: 64 IN

## 2022-12-15 DIAGNOSIS — I77.9 DISORDER OF ARTERIES AND ARTERIOLES, UNSPECIFIED: ICD-10-CM

## 2022-12-15 DIAGNOSIS — Z01.818 ENCOUNTER FOR OTHER PREPROCEDURAL EXAMINATION: ICD-10-CM

## 2022-12-15 PROCEDURE — 93000 ELECTROCARDIOGRAM COMPLETE: CPT

## 2022-12-15 PROCEDURE — 99215 OFFICE O/P EST HI 40 MIN: CPT | Mod: 25

## 2022-12-15 PROCEDURE — 36415 COLL VENOUS BLD VENIPUNCTURE: CPT

## 2022-12-15 NOTE — DISCUSSION/SUMMARY
[Patient] : the patient [EKG obtained to assist in diagnosis and management of assessed problem(s)] : EKG obtained to assist in diagnosis and management of assessed problem(s) [___ Week(s)] : in [unfilled] week(s) [FreeTextEntry1] : \par 58 y/o female with h/o predm, carotid disease, pad, pvc, family h/o aneurysm, asa who presents for initial evaluation today and preop evaluation for hand surgery\par \par -ekg ordered today - nsr, normal intervals, no st/t changes\par -labs 2022 reviewed\par -ordered preop labs\par -start asa, statin for carotid disease and pad - lipitor 10 mg qhs \par -will send for ambulatory bp monitor \par -ordered calcium score \par -Echo 3/22: normal lv size/fxn, ef 62%, no mvp, trace mr/tr/pr, small effusion, asa, bubbles - c/w asd/pfo or small intrapulm shunt\par Carotid u/s 2022: 0-19% stenosis bilateral prox ICA\par -BRENDA: ef 62%, normal lv size/fxn, saline contrast - late opacification left side by few bubbles c/w asd/pfo or small intrapulm shunt, unable to discern whether shunt interatrial or intrapulmonary, trace ai/tr/pr, mild mr, trace effusion, normal asc ao, minimal ao arch plaque, desc thoracic aorta minimal plaque\par -ETT: exercised 88% mphr, no ekg changes\par -CV MRI: 2019: normal bi ventricular size/fxn, ef 59% , no scar, interatrial septal aneurysm, mild to mod peric effusion, severe pectus excavatum \par -refer for genetic testing to Dr. Rena concepcion excavatum, family h/o aneurysm\par -obtain most recent holter \par -elevated htn in past in MD offices \par -counseled on cvd risk factors\par -f/up 3 weeks for bp\par \par \par I have spent 60 minutes reviewing labs, records, tests and discussing cvd risk factors, htn, preop eval

## 2022-12-15 NOTE — HISTORY OF PRESENT ILLNESS
[FreeTextEntry1] : \par 56 y/o female with h/o predm, carotid disease, pad, pvc, family h/o aneurysm, asa who presents for initial evaluation today and preop evaluation for hand surgery\par \par no h/o chf, rf, ckd, dm\par \par no cp, sob, syncope, lh, edema, orthopnea, pnd\par notes intermittent palpitations \par \par has right hand tumor - scheduled for removal surgery\par  \par walks for exercise\par diet healthy\par notes anxiety - has seen therapist in past \par \par was on metoprolol in past - stopped last month - was on for year\par \par last event monitor within past year\par \par holter with pvcs and 4beat run of nsvt.\par \par sees endo for thyroid and osteoporosis\par \par family h/o thoracic aortic aneurysm\par \par Echo 3/22: normal lv size/fxn, ef 62%, no mvp, trace mr/tr/pr, small effusion, asa, bubbles - c/w asd/pfo or small intrapulm shunt\par Carotid u/s : 0-19% stenosis bilateral prox ICA\par BRENDA: ef 62%, normal lv size/fxn, saline contrast - late opacification lef side by few bubbles c/w asd/pfo or small intrapulm shunt, unable to discern whether shunt interatrial or intrapulmonary, trace ai/tr/pr, mild mr, trace effusion, normal asc ao, minimal ao arch plaque, desc thoracic aorta minimal plaque\par \par ETT: exercised 88% mphr, no ekg changes\par CV MRI: 2019: normal bi ventricular size/fxn, ef 59% , no scar, interatrial septal aneurysm, mild to mod peric effusion, severe pectus excavatum \par elevated htn in past in [facility] offices\par \par has been on HRT for past year \par \par \par \par PMH/PSH:\par carotid disease\par osteoporosis\par actinic keratosis\par angioma skin\par jared's syndrome\par thyroid nodule\par sebhorrheic keratosis\par uti\par otalgia\par parotidectomy\par myomectomy\par uterine polyp removal\par D&C \par predm\par pad\par pvc\par pectus excavatum\par \par \par ALL:\par fluconazole\par \par MEDS:\par alendronate\par vit D3\par Femhrt \par \par \par SH:\par no tobacco\par social etoh\par no drugs\par lives w male partner\par no children\par works in marketing\par from [place]\par \par \par FH:\par mother - alive, 90, htn \par father - h/o thoracic aortic dissection,  AD 86\par sister - h/o aortic aneurysm s/p repair, alive 67\par brother - alive, 64, healthy

## 2022-12-16 ENCOUNTER — NON-APPOINTMENT (OUTPATIENT)
Age: 57
End: 2022-12-16

## 2022-12-19 LAB
ALBUMIN SERPL ELPH-MCNC: 4.7 G/DL
ALP BLD-CCNC: 50 U/L
ALT SERPL-CCNC: 14 U/L
ANION GAP SERPL CALC-SCNC: 13 MMOL/L
APTT BLD: 33.9 SEC
AST SERPL-CCNC: 19 U/L
BASOPHILS # BLD AUTO: 0.04 K/UL
BASOPHILS NFR BLD AUTO: 0.8 %
BILIRUB SERPL-MCNC: 0.3 MG/DL
BUN SERPL-MCNC: 14 MG/DL
CALCIUM SERPL-MCNC: 9.7 MG/DL
CHLORIDE SERPL-SCNC: 104 MMOL/L
CO2 SERPL-SCNC: 25 MMOL/L
CREAT SERPL-MCNC: 0.69 MG/DL
EGFR: 100 ML/MIN/1.73M2
EOSINOPHIL # BLD AUTO: 0.02 K/UL
EOSINOPHIL NFR BLD AUTO: 0.4 %
GLUCOSE SERPL-MCNC: 109 MG/DL
HCT VFR BLD CALC: 41.4 %
HGB BLD-MCNC: 14.2 G/DL
IMM GRANULOCYTES NFR BLD AUTO: 0.2 %
INR PPP: 1.01 RATIO
LYMPHOCYTES # BLD AUTO: 1.44 K/UL
LYMPHOCYTES NFR BLD AUTO: 27.1 %
MAN DIFF?: NORMAL
MCHC RBC-ENTMCNC: 33 PG
MCHC RBC-ENTMCNC: 34.3 GM/DL
MCV RBC AUTO: 96.3 FL
MONOCYTES # BLD AUTO: 0.36 K/UL
MONOCYTES NFR BLD AUTO: 6.8 %
NEUTROPHILS # BLD AUTO: 3.45 K/UL
NEUTROPHILS NFR BLD AUTO: 64.7 %
PLATELET # BLD AUTO: 223 K/UL
POTASSIUM SERPL-SCNC: 4.5 MMOL/L
PROT SERPL-MCNC: 7.4 G/DL
PT BLD: 11.8 SEC
RBC # BLD: 4.3 M/UL
RBC # FLD: 13.3 %
SODIUM SERPL-SCNC: 142 MMOL/L
WBC # FLD AUTO: 5.32 K/UL

## 2022-12-21 ENCOUNTER — APPOINTMENT (OUTPATIENT)
Dept: DERMATOLOGY | Facility: CLINIC | Age: 57
End: 2022-12-21

## 2022-12-21 ENCOUNTER — NON-APPOINTMENT (OUTPATIENT)
Age: 57
End: 2022-12-21

## 2022-12-21 ENCOUNTER — LABORATORY RESULT (OUTPATIENT)
Age: 57
End: 2022-12-21

## 2022-12-21 DIAGNOSIS — D48.7 NEOPLASM OF UNCERTAIN BEHAVIOR OF OTHER SPECIFIED SITES: ICD-10-CM

## 2022-12-21 DIAGNOSIS — L85.3 XEROSIS CUTIS: ICD-10-CM

## 2022-12-21 PROCEDURE — 11103 TANGNTL BX SKIN EA SEP/ADDL: CPT | Mod: 59

## 2022-12-21 PROCEDURE — 99214 OFFICE O/P EST MOD 30 MIN: CPT | Mod: 25

## 2022-12-21 PROCEDURE — 11102 TANGNTL BX SKIN SINGLE LES: CPT

## 2022-12-21 RX ORDER — MUPIROCIN 20 MG/G
2 OINTMENT TOPICAL
Qty: 1 | Refills: 11 | Status: ACTIVE | COMMUNITY
Start: 2022-12-21 | End: 1900-01-01

## 2022-12-21 NOTE — PHYSICAL EXAM
[Alert] : alert [Oriented x 3] : ~L oriented x 3 [Well Nourished] : well nourished [Conjunctiva Non-injected] : conjunctiva non-injected [No Visual Lymphadenopathy] : no visual  lymphadenopathy [No Clubbing] : no clubbing [No Edema] : no edema [No Bromhidrosis] : no bromhidrosis [No Chromhidrosis] : no chromhidrosis [Full Body Skin Exam Performed] : performed [FreeTextEntry3] : thin pink papule L medial cheek without telangiectasia or pearliness\par R cheek very subtle pigmented macule with linear pearly area\par firm white papules medial chest and L axilla\par L plantar surface even brown symmetric macule with pigment parallel in furrows, erythema subtly throughout

## 2022-12-21 NOTE — HISTORY OF PRESENT ILLNESS
[FreeTextEntry1] : bumps, moles [de-identified] : bump in L armpit x 2 mos now getting smaller and on chest\par new spot on foot in past yr\par no hx skin cancer, hx AKs

## 2022-12-22 ENCOUNTER — APPOINTMENT (OUTPATIENT)
Dept: ENDOCRINOLOGY | Facility: CLINIC | Age: 57
End: 2022-12-22

## 2022-12-22 VITALS
HEIGHT: 64 IN | WEIGHT: 112 LBS | TEMPERATURE: 97.2 F | HEART RATE: 84 BPM | DIASTOLIC BLOOD PRESSURE: 89 MMHG | SYSTOLIC BLOOD PRESSURE: 139 MMHG | BODY MASS INDEX: 19.12 KG/M2 | OXYGEN SATURATION: 98 %

## 2022-12-22 PROCEDURE — 99214 OFFICE O/P EST MOD 30 MIN: CPT

## 2022-12-22 RX ORDER — METOPROLOL TARTRATE 25 MG/1
25 TABLET, FILM COATED ORAL
Refills: 0 | Status: DISCONTINUED | COMMUNITY
End: 2022-12-22

## 2022-12-22 RX ORDER — ESTRADIOL 10 UG/1
TABLET VAGINAL
Refills: 0 | Status: DISCONTINUED | COMMUNITY
End: 2022-12-22

## 2022-12-22 RX ORDER — ASPIRIN 81 MG/1
81 TABLET ORAL DAILY
Qty: 30 | Refills: 6 | Status: DISCONTINUED | COMMUNITY
Start: 2022-12-15 | End: 2022-12-22

## 2022-12-22 RX ORDER — ATORVASTATIN CALCIUM 10 MG/1
10 TABLET, FILM COATED ORAL
Qty: 30 | Refills: 3 | Status: DISCONTINUED | COMMUNITY
Start: 2022-12-15 | End: 2022-12-22

## 2022-12-22 RX ORDER — ESTRADIOL 10 UG/1
10 TABLET VAGINAL
Qty: 24 | Refills: 0 | Status: DISCONTINUED | COMMUNITY
Start: 2017-06-01 | End: 2022-12-22

## 2022-12-22 RX ORDER — NORETHINDRONE ACETATE/ETHINYL ESTRADIOL .5; 2.5 MG/1; UG/1
0.5-2.5 TABLET ORAL
Refills: 0 | Status: ACTIVE | COMMUNITY

## 2022-12-22 NOTE — PHYSICAL EXAM
[Alert] : alert [No Acute Distress] : no acute distress [EOMI] : extra ocular movement intact [Thyroid Not Enlarged] : the thyroid was not enlarged [No Respiratory Distress] : no respiratory distress [No Accessory Muscle Use] : no accessory muscle use [Clear to Auscultation] : lungs were clear to auscultation bilaterally [Normal Bowel Sounds] : normal bowel sounds [Not Tender] : non-tender [Soft] : abdomen soft [Normal Gait] : normal gait [No Motor Deficits] : the motor exam was normal [Normal Affect] : the affect was normal [Normal Insight/Judgement] : insight and judgment were intact [Normal Mood] : the mood was normal

## 2022-12-22 NOTE — ASSESSMENT
[Bisphosphonate Therapy] : Risks and benefits of bisphosphonate therapy were  discussed with the patient including gastroesophageal irritation, osteonecrosis of the jaw, and atypical femur fractures, and acute phase reaction [FreeTextEntry1] : Patient is a 58 yo woman with osteoporosis and thyroid nodules here for follow up\par \par 1. Osteoporosis\par -patient diagnosed on routine DXA.  Started on Fosamax in July 2021 and reports adherence\par -there have been no interval fractures though she had a full during the summer\par -continue vitamin D 1,000 IU daily\par -no plans for major dental work\par -calcium and creatinine levels checked by cardiology 12/15 are within expected normative ranges\par \par 2. Thyroid nodule\par -patient is clinically and chemically euthyroid\par -TFTs normal November 2022\par -repeat thyroid US June 2023\par \par 3. Prediabetes\par -patient has had prediabetes dating back to at least 2014\par -she is a healthy weight\par -encourage less sugars which she does not even have regularly\par \par Follow up in June 2023

## 2022-12-22 NOTE — REVIEW OF SYSTEMS
[Fatigue] : no fatigue [Decreased Appetite] : appetite not decreased [Dysphagia] : no dysphagia [Dysphonia] : no dysphonia [Chest Pain] : no chest pain [Slow Heart Rate] : heart rate is not slow [Palpitations] : no palpitations [Fast Heart Rate] : heart rate is not fast [Shortness Of Breath] : no shortness of breath [Nausea] : no nausea [Constipation] : no constipation [Vomiting] : no vomiting [Diarrhea] : no diarrhea [Depression] : no depression [Cold Intolerance] : no cold intolerance [Heat Intolerance] : no heat intolerance

## 2022-12-22 NOTE — HISTORY OF PRESENT ILLNESS
[FreeTextEntry1] : Patient is a 58 yo woman following up for osteoporosis.\par \par Diagnosed with osteoporosis based on screening DXA in 2021. She asked her gynecologist if osteoporosis testing should be done earlier based on family hx. \par No exposures to osteoporosis medicines. \par States she has fallen "oddly" due to being clumsy. Fell three years ago and hit her face in [place]. Patient had a concussion.\par No fractures, broke nose after falling down subway steps about 15-20 years ago\par Menopause age 50. Has some cold intolerance but no reported hot flashes. Never been on HRT. Took OCPs in the past for "hyperplasia" duration of 1 year. \par Diet: drinks milk, eats cheese\par Walks a lot-about 1 hour daily, used to go to the gym but stopped going since the pandemic. Swims. \par Based on DA with femoral neck T score -2.8, Fosamax was started July 2021\par No plans for major dental work\par Patient had a fall in the summer, scraped her knee but no fractures\par \par Patient reports she had carotid doppler with incidental finding of thyroid nodules. Diagnostic US revealed small thyroid nodules \par No family hx of thyroid disease \par \par Prediabetes: likes sweets, but tries not to have too many\par She was prescribed atorvastatin but never took it.

## 2023-01-19 ENCOUNTER — TRANSCRIPTION ENCOUNTER (OUTPATIENT)
Age: 58
End: 2023-01-19

## 2023-01-28 ENCOUNTER — TRANSCRIPTION ENCOUNTER (OUTPATIENT)
Age: 58
End: 2023-01-28

## 2023-01-31 ENCOUNTER — TRANSCRIPTION ENCOUNTER (OUTPATIENT)
Age: 58
End: 2023-01-31

## 2023-02-09 ENCOUNTER — TRANSCRIPTION ENCOUNTER (OUTPATIENT)
Age: 58
End: 2023-02-09

## 2023-02-10 ENCOUNTER — NON-APPOINTMENT (OUTPATIENT)
Age: 58
End: 2023-02-10

## 2023-02-11 ENCOUNTER — TRANSCRIPTION ENCOUNTER (OUTPATIENT)
Age: 58
End: 2023-02-11

## 2023-02-16 ENCOUNTER — TRANSCRIPTION ENCOUNTER (OUTPATIENT)
Age: 58
End: 2023-02-16

## 2023-02-18 ENCOUNTER — TRANSCRIPTION ENCOUNTER (OUTPATIENT)
Age: 58
End: 2023-02-18

## 2023-02-23 ENCOUNTER — TRANSCRIPTION ENCOUNTER (OUTPATIENT)
Age: 58
End: 2023-02-23

## 2023-02-24 ENCOUNTER — TRANSCRIPTION ENCOUNTER (OUTPATIENT)
Age: 58
End: 2023-02-24

## 2023-02-28 ENCOUNTER — TRANSCRIPTION ENCOUNTER (OUTPATIENT)
Age: 58
End: 2023-02-28

## 2023-03-01 ENCOUNTER — APPOINTMENT (OUTPATIENT)
Dept: DERMATOLOGY | Facility: CLINIC | Age: 58
End: 2023-03-01

## 2023-04-24 ENCOUNTER — APPOINTMENT (OUTPATIENT)
Dept: INTERNAL MEDICINE | Facility: CLINIC | Age: 58
End: 2023-04-24

## 2023-05-01 ENCOUNTER — TRANSCRIPTION ENCOUNTER (OUTPATIENT)
Age: 58
End: 2023-05-01

## 2023-06-13 ENCOUNTER — APPOINTMENT (OUTPATIENT)
Dept: ENDOCRINOLOGY | Facility: CLINIC | Age: 58
End: 2023-06-13
Payer: COMMERCIAL

## 2023-06-13 VITALS
HEART RATE: 79 BPM | OXYGEN SATURATION: 95 % | TEMPERATURE: 97.8 F | SYSTOLIC BLOOD PRESSURE: 137 MMHG | WEIGHT: 112 LBS | HEIGHT: 64 IN | BODY MASS INDEX: 19.12 KG/M2 | DIASTOLIC BLOOD PRESSURE: 92 MMHG

## 2023-06-13 PROCEDURE — 99214 OFFICE O/P EST MOD 30 MIN: CPT

## 2023-06-13 NOTE — PHYSICAL EXAM
[Alert] : alert [No Acute Distress] : no acute distress [EOMI] : extra ocular movement intact [Thyroid Not Enlarged] : the thyroid was not enlarged [No Respiratory Distress] : no respiratory distress [No Accessory Muscle Use] : no accessory muscle use [Clear to Auscultation] : lungs were clear to auscultation bilaterally [Normal Bowel Sounds] : normal bowel sounds [Not Tender] : non-tender [Soft] : abdomen soft [Normal Gait] : normal gait [No Motor Deficits] : the motor exam was normal [No Tremors] : no tremors [Normal Affect] : the affect was normal [Normal Insight/Judgement] : insight and judgment were intact [Normal Mood] : the mood was normal

## 2023-06-13 NOTE — HISTORY OF PRESENT ILLNESS
[FreeTextEntry1] : Patient is a 58 yo woman following up for osteoporosis.\par \par Diagnosed with osteoporosis based on screening DXA in 2021. She asked her gynecologist if osteoporosis testing should be done earlier based on family hx. \par No exposures to osteoporosis medicines. \par States she has fallen "oddly" due to being clumsy. Fell three years ago and hit her face in [place]. Patient had a concussion.\par No fractures, broke nose after falling down subway steps about 15-20 years ago\par Menopause age 50. Has some cold intolerance but no reported hot flashes. Never been on HRT. Took OCPs in the past for "hyperplasia" duration of 1 year. \par Diet: drinks milk, eats cheese\par Walks a lot-about 1 hour daily, used to go to the gym but stopped going since the pandemic. Swims. \par Based on DA with femoral neck T score -2.8, Fosamax was started July 2021\par No plans for major dental work. Last visit was a few weeks ago.\par Patient had some lip and eye twitching. Unsure whether it was stress related. Saw a therapist. Twitching has since resolved\par \par Patient reports she had carotid doppler with incidental finding of thyroid nodules. Diagnostic US revealed small thyroid nodules \par No family hx of thyroid disease \par June 2022 thyroid US revealed a subcentimeter right cystic nodule and right interpolar hypoechoic nodule measuring 0.3 cm. There was also a left interpolar nodule 0.3 cm and lower pole 0.3 cm nodule\par

## 2023-06-13 NOTE — ASSESSMENT
[Bisphosphonate Therapy] : Risks and benefits of bisphosphonate therapy were  discussed with the patient including gastroesophageal irritation, osteonecrosis of the jaw, and atypical femur fractures, and acute phase reaction [FreeTextEntry1] : Patient is a 58 yo woman with osteoporosis, prediabetes and thyroid nodules here for follow up\par \par 1. Osteoporosis\par -patient diagnosed on routine DXA. Started on Fosamax in July 2021 and reports adherence\par -there have been no interval fractures though she had a full during the summer\par -continue vitamin D 1,000 IU daily\par -no plans for major dental work\par -calcium and creatinine levels to be checked today along with vitamin D\par -repeat DXA 2024\par \par 2. Thyroid nodule\par -patient is clinically and chemically euthyroid\par -clinically euthyroid\par -repeat thyroid US June 2023; referral given today\par \par 3. Prediabetes\par -patient has had prediabetes dating back to at least 2014\par -she is a healthy weight\par -repeat serum A1c\par \par Follow up in one year\par

## 2023-06-13 NOTE — REVIEW OF SYSTEMS
[Fatigue] : no fatigue [Decreased Appetite] : appetite not decreased [Dysphagia] : no dysphagia [Chest Pain] : no chest pain [Palpitations] : no palpitations [Nausea] : no nausea [Constipation] : no constipation [Vomiting] : no vomiting [Diarrhea] : no diarrhea [As Noted in HPI] : as noted in HPI [FreeTextEntry9] : twitching

## 2023-06-14 ENCOUNTER — APPOINTMENT (OUTPATIENT)
Dept: DERMATOLOGY | Facility: CLINIC | Age: 58
End: 2023-06-14
Payer: COMMERCIAL

## 2023-06-14 LAB
25(OH)D3 SERPL-MCNC: 28.1 NG/ML
ANION GAP SERPL CALC-SCNC: 12 MMOL/L
BUN SERPL-MCNC: 11 MG/DL
CALCIUM SERPL-MCNC: 9.5 MG/DL
CHLORIDE SERPL-SCNC: 109 MMOL/L
CO2 SERPL-SCNC: 24 MMOL/L
CREAT SERPL-MCNC: 0.74 MG/DL
EGFR: 93 ML/MIN/1.73M2
ESTIMATED AVERAGE GLUCOSE: 126 MG/DL
GLUCOSE SERPL-MCNC: 100 MG/DL
HBA1C MFR BLD HPLC: 6 %
POTASSIUM SERPL-SCNC: 4.1 MMOL/L
SODIUM SERPL-SCNC: 145 MMOL/L

## 2023-06-14 PROCEDURE — 17000 DESTRUCT PREMALG LESION: CPT

## 2023-06-14 PROCEDURE — 99213 OFFICE O/P EST LOW 20 MIN: CPT | Mod: 25

## 2023-06-14 NOTE — HISTORY OF PRESENT ILLNESS
[FreeTextEntry1] : FU BCC, severe atn [de-identified] : no new or changing spots\par hx of bcc\par hx of dysplatic nevus \par now only has one baggy eyelid, unhappy with that and spots on face \par going to [place] and [facility] for vacation with boyfriend in a couple weeks \par

## 2023-06-14 NOTE — PHYSICAL EXAM
[Alert] : alert [Oriented x 3] : ~L oriented x 3 [Well Nourished] : well nourished [Conjunctiva Non-injected] : conjunctiva non-injected [No Visual Lymphadenopathy] : no visual  lymphadenopathy [No Clubbing] : no clubbing [No Edema] : no edema [No Bromhidrosis] : no bromhidrosis [No Chromhidrosis] : no chromhidrosis [Full Body Skin Exam Performed] : performed [FreeTextEntry3] : white skin\par whi on the L plantar foot\par cherry colored papules trunk\par gritty pink brown papule forehead\par other ill defined brown macules some fine scale cheeks

## 2023-06-14 NOTE — ASSESSMENT
[FreeTextEntry1] : plan to freeze some pigments AK vs macular SKs next visit since pt is going on vacation.

## 2023-07-25 ENCOUNTER — APPOINTMENT (OUTPATIENT)
Dept: INTERNAL MEDICINE | Facility: CLINIC | Age: 58
End: 2023-07-25
Payer: COMMERCIAL

## 2023-07-25 VITALS
HEIGHT: 64 IN | BODY MASS INDEX: 19.12 KG/M2 | OXYGEN SATURATION: 98 % | RESPIRATION RATE: 13 BRPM | SYSTOLIC BLOOD PRESSURE: 120 MMHG | TEMPERATURE: 98.2 F | HEART RATE: 86 BPM | DIASTOLIC BLOOD PRESSURE: 84 MMHG | WEIGHT: 112 LBS

## 2023-07-25 DIAGNOSIS — R19.4 CHANGE IN BOWEL HABIT: ICD-10-CM

## 2023-07-25 DIAGNOSIS — F41.9 ANXIETY DISORDER, UNSPECIFIED: ICD-10-CM

## 2023-07-25 PROCEDURE — 99214 OFFICE O/P EST MOD 30 MIN: CPT

## 2023-07-25 NOTE — PLAN
[FreeTextEntry1] : Anxiety - - reviewed options - she does not want medication\par therapist referral\par \par Stool changes - will start with metamucil and increase fluids\par \par pre- DM- reviewed in detail

## 2023-07-25 NOTE — HISTORY OF PRESENT ILLNESS
[de-identified] : 58yo female with hx of mvp, osteoporosis here for comprehensive physical exam. \par \par - loose stools for past few weeks, no blood, no pain\par  - did travel recently.  - diet has been different recently as well.  \par - mor anxiety - has seen a therapist. - hates her job. \par - does not want to take medication\par - will be seeing a new cardiologist.  \par

## 2023-08-01 ENCOUNTER — APPOINTMENT (OUTPATIENT)
Dept: ULTRASOUND IMAGING | Facility: CLINIC | Age: 58
End: 2023-08-01
Payer: COMMERCIAL

## 2023-08-01 ENCOUNTER — OUTPATIENT (OUTPATIENT)
Dept: OUTPATIENT SERVICES | Facility: HOSPITAL | Age: 58
LOS: 1 days | End: 2023-08-01

## 2023-08-01 PROCEDURE — 76536 US EXAM OF HEAD AND NECK: CPT | Mod: 26

## 2023-09-01 ENCOUNTER — TRANSCRIPTION ENCOUNTER (OUTPATIENT)
Age: 58
End: 2023-09-01

## 2023-11-27 ENCOUNTER — APPOINTMENT (OUTPATIENT)
Dept: DERMATOLOGY | Facility: CLINIC | Age: 58
End: 2023-11-27
Payer: COMMERCIAL

## 2023-11-27 DIAGNOSIS — L57.0 ACTINIC KERATOSIS: ICD-10-CM

## 2023-11-27 PROCEDURE — 99213 OFFICE O/P EST LOW 20 MIN: CPT | Mod: 25

## 2023-11-27 PROCEDURE — 17003 DESTRUCT PREMALG LES 2-14: CPT | Mod: 59

## 2023-11-27 PROCEDURE — 17000 DESTRUCT PREMALG LESION: CPT

## 2023-12-22 ENCOUNTER — RX RENEWAL (OUTPATIENT)
Age: 58
End: 2023-12-22

## 2023-12-22 RX ORDER — ALENDRONATE SODIUM 70 MG/1
70 TABLET ORAL
Qty: 12 | Refills: 3 | Status: ACTIVE | COMMUNITY
Start: 2021-07-15 | End: 1900-01-01

## 2024-05-22 ENCOUNTER — APPOINTMENT (OUTPATIENT)
Dept: DERMATOLOGY | Facility: CLINIC | Age: 59
End: 2024-05-22
Payer: COMMERCIAL

## 2024-05-22 DIAGNOSIS — D23.9 OTHER BENIGN NEOPLASM OF SKIN, UNSPECIFIED: ICD-10-CM

## 2024-05-22 DIAGNOSIS — L82.1 OTHER SEBORRHEIC KERATOSIS: ICD-10-CM

## 2024-05-22 DIAGNOSIS — Z12.83 ENCOUNTER FOR SCREENING FOR MALIGNANT NEOPLASM OF SKIN: ICD-10-CM

## 2024-05-22 DIAGNOSIS — D18.01 HEMANGIOMA OF SKIN AND SUBCUTANEOUS TISSUE: ICD-10-CM

## 2024-05-22 DIAGNOSIS — L72.0 EPIDERMAL CYST: ICD-10-CM

## 2024-05-22 DIAGNOSIS — Z85.828 PERSONAL HISTORY OF OTHER MALIGNANT NEOPLASM OF SKIN: ICD-10-CM

## 2024-05-22 PROCEDURE — 99213 OFFICE O/P EST LOW 20 MIN: CPT

## 2024-05-22 NOTE — PHYSICAL EXAM
[Alert] : alert [Oriented x 3] : ~L oriented x 3 [Well Nourished] : well nourished [Conjunctiva Non-injected] : conjunctiva non-injected [No Visual Lymphadenopathy] : no visual  lymphadenopathy [No Clubbing] : no clubbing [No Edema] : no edema [No Bromhidrosis] : no bromhidrosis [No Chromhidrosis] : no chromhidrosis [Full Body Skin Exam Performed] : performed [FreeTextEntry3] : white skin whi on the L plantar foot and R cheek white papule R cheek

## 2024-05-22 NOTE — HISTORY OF PRESENT ILLNESS
[FreeTextEntry1] : FU BCC, severe atn [de-identified] : lv---11/2023 hx of bcc and severely dysplatic nevus of foot  no new or changing spots

## 2024-06-18 ENCOUNTER — APPOINTMENT (OUTPATIENT)
Dept: ENDOCRINOLOGY | Facility: CLINIC | Age: 59
End: 2024-06-18
Payer: COMMERCIAL

## 2024-06-18 VITALS
HEIGHT: 64 IN | HEART RATE: 89 BPM | BODY MASS INDEX: 18.95 KG/M2 | SYSTOLIC BLOOD PRESSURE: 135 MMHG | OXYGEN SATURATION: 97 % | TEMPERATURE: 97.3 F | WEIGHT: 111 LBS | DIASTOLIC BLOOD PRESSURE: 78 MMHG

## 2024-06-18 DIAGNOSIS — R73.03 PREDIABETES.: ICD-10-CM

## 2024-06-18 DIAGNOSIS — M81.0 AGE-RELATED OSTEOPOROSIS W/OUT CURRENT PATHOLOGICAL FRACTURE: ICD-10-CM

## 2024-06-18 DIAGNOSIS — E04.1 NONTOXIC SINGLE THYROID NODULE: ICD-10-CM

## 2024-06-18 PROCEDURE — 99214 OFFICE O/P EST MOD 30 MIN: CPT

## 2024-06-18 NOTE — ASSESSMENT
[FreeTextEntry1] : Patient is a 57 yo woman following up for osteoporosis, prediabetes and thyroid nodules 1. Osteoporosis -patient diagnosed on routine DXA. Started on Fosamax in July 2021 and reports adherence -there have been no interval fractures -continue vitamin D 1,000 IU daily -no plans for major dental work -repeat DXA 2024 due now; she says she's getting  and wonders if it can be done after her wedding.  Patient can get it when she's ready but hopefully this year. Continue with the alendronate at this time -she had blood work done with new PCP and will fax results for review  2. Thyroid nodule -patient is clinically euthyroid but has always had cold intolerance -patient will fax lab results -repeat thyroid US August 2024 referral given today  3. Prediabetes -patient has had prediabetes dating back to at least 2014 -she is a healthy weight  Follow up in one year; sooner based on findings

## 2024-06-18 NOTE — REVIEW OF SYSTEMS
[Fatigue] : no fatigue [Chest Pain] : no chest pain [Palpitations] : no palpitations [Stress] : stress

## 2024-06-18 NOTE — PHYSICAL EXAM
[Alert] : alert [No Acute Distress] : no acute distress [EOMI] : extra ocular movement intact [Thyroid Not Enlarged] : the thyroid was not enlarged [No Respiratory Distress] : no respiratory distress [No Accessory Muscle Use] : no accessory muscle use [Clear to Auscultation] : lungs were clear to auscultation bilaterally [Normal Bowel Sounds] : normal bowel sounds [Not Tender] : non-tender [Soft] : abdomen soft [Normal Gait] : normal gait [Normal Affect] : the affect was normal [Normal Insight/Judgement] : insight and judgment were intact [Normal Mood] : the mood was normal

## 2024-06-18 NOTE — HISTORY OF PRESENT ILLNESS
[FreeTextEntry1] : Patient is a 59 yo woman following up for osteoporosis and thyroid nodules  Diagnosed with osteoporosis based on screening DXA in 2021. She asked her gynecologist if osteoporosis testing should be done earlier based on family hx. No exposures to osteoporosis medicines. States she has fallen "oddly" due to being clumsy.  No fractures, broke nose after falling [place] subway steps about 15-20 years ago Menopause age 50. Never on HRT. Took OCPs in the past for "hyperplasia" duration of 1 year. Diet: drinks milk, eats cheese Walks a lot-about 1 hour daily, used to go to the gym but stopped going since the pandemic. Swims. Based on DA with femoral neck T score -2.8, Fosamax was started July 2021 No plans for major dental work. Last visit was a few weeks ago.  Patient reports she had carotid doppler with incidental finding of thyroid nodules. Diagnostic US revealed small thyroid nodules No family hx of thyroid disease June 2022 thyroid US revealed a subcentimeter right cystic nodule and right interpolar hypoechoic nodule measuring 0.3 cm. There was also a left interpolar nodule 0.3 cm and lower pole 0.3 cm nodule Surveillance thyroid US August 2023 was stable

## 2024-09-19 ENCOUNTER — NON-APPOINTMENT (OUTPATIENT)
Age: 59
End: 2024-09-19

## 2024-12-17 ENCOUNTER — APPOINTMENT (OUTPATIENT)
Dept: ENDOCRINOLOGY | Facility: CLINIC | Age: 59
End: 2024-12-17

## 2025-02-10 ENCOUNTER — APPOINTMENT (OUTPATIENT)
Dept: RADIOLOGY | Facility: CLINIC | Age: 60
End: 2025-02-10
Payer: MEDICAID

## 2025-02-10 ENCOUNTER — OUTPATIENT (OUTPATIENT)
Dept: OUTPATIENT SERVICES | Facility: HOSPITAL | Age: 60
LOS: 1 days | End: 2025-02-10

## 2025-02-10 ENCOUNTER — APPOINTMENT (OUTPATIENT)
Dept: ULTRASOUND IMAGING | Facility: CLINIC | Age: 60
End: 2025-02-10
Payer: MEDICAID

## 2025-02-10 PROCEDURE — 76536 US EXAM OF HEAD AND NECK: CPT | Mod: 26

## 2025-02-10 PROCEDURE — 77080 DXA BONE DENSITY AXIAL: CPT | Mod: 26

## 2025-03-03 ENCOUNTER — NON-APPOINTMENT (OUTPATIENT)
Age: 60
End: 2025-03-03

## 2025-03-03 ENCOUNTER — APPOINTMENT (OUTPATIENT)
Dept: OTOLARYNGOLOGY | Facility: CLINIC | Age: 60
End: 2025-03-03

## 2025-03-03 ENCOUNTER — APPOINTMENT (OUTPATIENT)
Dept: OTOLARYNGOLOGY | Facility: CLINIC | Age: 60
End: 2025-03-03
Payer: MEDICAID

## 2025-03-03 DIAGNOSIS — H93.299 OTHER ABNORMAL AUDITORY PERCEPTIONS, UNSPECIFIED EAR: ICD-10-CM

## 2025-03-03 PROCEDURE — 92567 TYMPANOMETRY: CPT

## 2025-03-03 PROCEDURE — 92557 COMPREHENSIVE HEARING TEST: CPT

## 2025-03-03 PROCEDURE — 99213 OFFICE O/P EST LOW 20 MIN: CPT

## 2025-03-07 ENCOUNTER — APPOINTMENT (OUTPATIENT)
Dept: OTOLARYNGOLOGY | Facility: CLINIC | Age: 60
End: 2025-03-07
Payer: MEDICAID

## 2025-03-07 PROCEDURE — V5010 ASSESSMENT FOR HEARING AID: CPT | Mod: NC

## 2025-03-10 ENCOUNTER — NON-APPOINTMENT (OUTPATIENT)
Age: 60
End: 2025-03-10

## 2025-03-18 ENCOUNTER — APPOINTMENT (OUTPATIENT)
Dept: DERMATOLOGY | Facility: CLINIC | Age: 60
End: 2025-03-18
Payer: MEDICAID

## 2025-03-18 DIAGNOSIS — L30.9 DERMATITIS, UNSPECIFIED: ICD-10-CM

## 2025-03-18 DIAGNOSIS — D18.01 HEMANGIOMA OF SKIN AND SUBCUTANEOUS TISSUE: ICD-10-CM

## 2025-03-18 DIAGNOSIS — D22.9 MELANOCYTIC NEVI, UNSPECIFIED: ICD-10-CM

## 2025-03-18 DIAGNOSIS — L82.1 OTHER SEBORRHEIC KERATOSIS: ICD-10-CM

## 2025-03-18 DIAGNOSIS — Z12.83 ENCOUNTER FOR SCREENING FOR MALIGNANT NEOPLASM OF SKIN: ICD-10-CM

## 2025-03-18 DIAGNOSIS — L85.3 XEROSIS CUTIS: ICD-10-CM

## 2025-03-18 DIAGNOSIS — L60.8 OTHER NAIL DISORDERS: ICD-10-CM

## 2025-03-18 DIAGNOSIS — L81.4 OTHER MELANIN HYPERPIGMENTATION: ICD-10-CM

## 2025-03-18 PROCEDURE — 99214 OFFICE O/P EST MOD 30 MIN: CPT

## 2025-03-18 RX ORDER — TRIAMCINOLONE ACETONIDE 1 MG/G
0.1 CREAM TOPICAL
Qty: 1 | Refills: 1 | Status: ACTIVE | COMMUNITY
Start: 2025-03-18 | End: 1900-01-01

## 2025-04-11 PROBLEM — H93.293 ABNORMAL AUDITORY PERCEPTION OF BOTH EARS: Status: ACTIVE | Noted: 2025-04-11

## 2025-04-23 ENCOUNTER — APPOINTMENT (OUTPATIENT)
Dept: OTOLARYNGOLOGY | Facility: CLINIC | Age: 60
End: 2025-04-23
Payer: MEDICAID

## 2025-04-23 PROCEDURE — V5160: CPT

## 2025-04-23 PROCEDURE — V5261H: CUSTOM

## 2025-05-01 ENCOUNTER — NON-APPOINTMENT (OUTPATIENT)
Age: 60
End: 2025-05-01

## 2025-05-07 ENCOUNTER — APPOINTMENT (OUTPATIENT)
Dept: OTOLARYNGOLOGY | Facility: CLINIC | Age: 60
End: 2025-05-07
Payer: MEDICAID

## 2025-05-07 PROCEDURE — 92593: CPT

## 2025-05-07 PROCEDURE — V5241: CPT

## 2025-08-06 ENCOUNTER — APPOINTMENT (OUTPATIENT)
Dept: ENDOCRINOLOGY | Facility: CLINIC | Age: 60
End: 2025-08-06
Payer: MEDICAID

## 2025-08-06 VITALS
DIASTOLIC BLOOD PRESSURE: 90 MMHG | SYSTOLIC BLOOD PRESSURE: 147 MMHG | HEIGHT: 64 IN | WEIGHT: 114 LBS | BODY MASS INDEX: 19.46 KG/M2 | HEART RATE: 61 BPM

## 2025-08-06 DIAGNOSIS — M81.0 AGE-RELATED OSTEOPOROSIS W/OUT CURRENT PATHOLOGICAL FRACTURE: ICD-10-CM

## 2025-08-06 DIAGNOSIS — E04.1 NONTOXIC SINGLE THYROID NODULE: ICD-10-CM

## 2025-08-06 PROCEDURE — G2211 COMPLEX E/M VISIT ADD ON: CPT | Mod: NC

## 2025-08-06 PROCEDURE — 99215 OFFICE O/P EST HI 40 MIN: CPT
